# Patient Record
(demographics unavailable — no encounter records)

---

## 2025-07-22 NOTE — OB HISTORY
[Total Preg ___] : : [unfilled] [Living ___] : [unfilled] (living) [Menopause  Age ____] : menopause occurred at age [unfilled]

## 2025-07-23 NOTE — PAST MEDICAL HISTORY
[Total Preg ___] : G[unfilled] [Living ___] : Living: [unfilled] [Menarche Age ____] : age at menarche was [unfilled] [Menopause Age____] : age at menopause was [unfilled]

## 2025-07-23 NOTE — PLAN
[TextEntry] : EUA TLH/BSO Omentectomy possible laparotomy tumor debulking and all indicated procedures. Options for surgical management discussed. Procedures offered patient included laparoscopic hysterectomy with bilateral salpingo-oophorectomy, along with possible staging.   The risk benefits and alternative to the recommended treatments were discussed. She was informed about potential complications of surgery including but not limited to bowel, bladder, and ureteral injuries. Infectious morbidity, along with bleeding and thromboembolic events were also discussed.   She showed clear understanding, was given the opportunity to ask questions and is amenable to the above surgical treatment. The patient will be setup for the above procedure in the near future.

## 2025-07-23 NOTE — HISTORY OF PRESENT ILLNESS
[FreeTextEntry1] : 65 year old para 0-0-0-0, LMP at age 50, patient of [facility] in [facility]. She was recently evaluated in ER (March) in [place]  with c/o worsening abdominal pain, abdominal distention and exertional dyspnea.  CT chest abdomen and pelvis noted for large right pleural effusion with partial compressive atelectasis of the right lower lobe without any evidence of pulmonary pleural nodules or mediastinal lymphadenopathy, also noted for heterogeneous mass in the bilateral adnexal regions highly suspicious for ovarian neoplasm. large volume ascites and omental nodularity and cake formation suggestive of peritoneal metastasis. She is s/p neoadjuvant chemotherapy x 6 cycles.  Tumor Markers:  CEA = 2.2 (05/27/25) CA 19-9 = 62 (05/27/25)  = 441 (03/10/25)  = 17.9 (06/17/25)  Most recent Pet (03/18/25) FINDINGS: Neck: there is no adenopathy No abnormal FDG avidity is present in the neck Thorax: Lungs: bibasilar atelectasis Mediastinum: Anterior superior mediastinal subcentimeter lymph node SUV max 2.0 image 67, precarinal subcentimeter lymph nodes image 73 SUV max 2.4. Left hilar lymph node SUV max 3.1 image 75 measuring 0.7 cm. Subcarinal lymph node SUV max 3.1 short axis 0.6 cm. Right hilar hypermetabolic activity SUV max 2.4. Right axillary lymph node image 71 short axis 0.7 cm SUV max 2.2. Hiatal hernia with hypermetabolic activity  SUV max 3.3 image 105. There is no hilar adenopathy. Chest wall: Intact There is moderate size right pleural effusion with low level hypermetabolic activity. There is no pericardial effusion. Abdomen: Evaluation of the solid intra-abdominal organs is limited secondary to the lack of intravenous contrast. The spleen, and kidneys are unremarkable. There is hypermetabolic focus medial segment left hepatic lobe SUV Max 4.6 which is indeterminant. Hypermetabolic activity contiguous with ventral body of the pancreas SUV max 9.3 image 127. Nodularity of the mesentery left upper quadrant SUV max 6.1. Abdominal ascites with hypermetabolic peritoneal activity upper abdomen SUV Max 3.2. Left mid abdominal mesenteric nodularity SUV Max 8.0 Right lower quadrant mesenteric nodularity with SUV max 4.8 image 162. There are hypermetabolic subcentimeter peripancreatic lymph nodes and periaortic lymph nodes.  There is left adrenal gland hypermetabolic focus image 121 SUV max 4.1. Soft tissue densities within the posterior mid pelvis/adnexa with SUV max 8.8. there is tracer uptake in the posterior inferior mid pelvis suspected region of the uterus SUV Max 11.3. Hypermetabolic activity within the pelvic peritoneum peripherally SUV max 2.0. No bowel obstruction is seen. Skeletal: There is no destructive osseous lesion. Physiologic carica, gastrointestinal and genitourinary FDG uptake is noted. IMPRESSION: Hypermetabolic metastatic disease involving thorax, abdomen and pelvis as above.  Repeat Pet CT (06/28/25) Chest: Interval resolution of bilateral pleural effusions. Non-FDG avid 2mm right upper lobe subpleural nodule (3/55), unchanged. Non-FDG avid 3mm left upper lobe pulmonary nodule (3/76), unchanged. Non-FDG avid 2mm left lower lobe pulmonary nodule (3/85), unchanged. Non-FDG avid 2mm left lower lobe subpleural nodule (3/85), unchanged. Right chest wall infuse a port with distal tip terminating in the right atrium. Physiologic FDG avidity is seen in the mediastinal blood pool and myocardium. Thoracic nodes: Overall interval improvement in FDG avid thoracic lymphadenopathy. 0.9 cm precarinal node (3/71), previously 1.1 cm, SUV max 2.5, previously 2.7. Decreased left hilar uptake (image 76), SUV max 2.7, previously 3.1. Decreased 0.5 cm subcarinal node (3/76), SUV max 2.5, previously 3.3 Hepatobiliary: Previously described hypermetabolic focus in the left lobe of the liver is not visualized on this study. Increased non-FDG avid 1.0 cm indeterminate right lobe liver hypodensity (2/112), previously 0.7 cm. Increased non FDG avid 0.9 cm left lobe liver hypodensity (3/125), too small to characterize, previously 0.5 cm. Spleen: No abnormal uptake. Pancreas: No abnormal uptake Adrenal glands: No abnormal uptake. Kitneys/utereters/bladder: Physiologic activity is seen. Abdominopelvic nodes: No abdnormal uptake. GI tract: Physiologic activity is seen. No abnormal uptake Peritoneium: Significant internavl improvement of previously described FDG avid peritoneal/mesenteric nodularity/uptake. Question of mild posterior left pelvic peritoneal uptake versus misregistration from adjacent loop of bowel (image 179), SUB max 2.0 Resolution of previous ascites. Reproductive organs: No abnormal uptake Bones/soft tissues: No abnormal uptake Additional Findings: None Impression:  1. Significant interval improvement of previously described hypermetabolic metastatic disease throughout the chest, abdomen, and pelvis, as above. 2. Interval resolution of bilateral pleural effusions and ascites. 3. No interval change in size of non-FDG avid bilateral pulmonary nodules measuring up to 3mm.  Presents today for initial office evaluation.  She is requesting definitive surgery.

## 2025-07-23 NOTE — ASSESSMENT
[FreeTextEntry1] : 65 year old para 0-0-0-0, LMP at age 50, With stage IV Ovarian Ca s/p neoadjuvant (NAC) x 6 cycles. With a complete CR. She presents today for definitive surgery.  = 441 (03/10/25) Down to 17.9 (06/17/25). I informed the patient that a laparotomy may be required to complete the above procedure.

## 2025-07-23 NOTE — PHYSICAL EXAM
[Chaperoned Physical Exam] : A chaperone was present in the examining room during all aspects of the physical examination. [MA] : MA [FreeTextEntry2] : Fabienne Tamayo  [TextEntry] : Well - developed, well-nourished female in no acute distress HEENT - wnl Breasts - symmetrical w/o masses or nipple discharge Abdomen - soft, non-tender, +BS Back - no CVA tenderness or spinal tenderness Extremities - w/o clubbing, cyanosis, no lesions noted Neuro - AAOx3  Pelvic Exam External Genitalia - with out lesions Vagina - mucosa atrophic, no lesions noted Cervix - no lesions Uterus -  6 wk size, nontender, mobile Adnexa - no palpable masses or tenderness b/l Rectovaginal - confirmatory

## 2025-07-23 NOTE — HISTORY OF PRESENT ILLNESS
[FreeTextEntry1] : 65 year old para 0-0-0-0, LMP at age 50, patient of [facility] in [facility]. She was recently evaluated in ER (March) in [place]  with c/o worsening abdominal pain, abdominal distention and exertional dyspnea.  CT chest abdomen and pelvis noted for large right pleural effusion with partial compressive atelectasis of the right lower lobe without any evidence of pulmonary pleural nodules or mediastinal lymphadenopathy, also noted for heterogeneous mass in the bilateral adnexal regions highly suspicious for ovarian neoplasm. large volume ascites and omental nodularity and cake formation suggestive of peritoneal metastasis. She is s/p neoadjuvant chemotherapy x 6 cycles.  Tumor Markers:  CEA = 2.2 (05/27/25) CA 19-9 = 62 (05/27/25)  = 441 (03/10/25)  = 17.9 (06/17/25)  Most recent Pet (03/18/25) FINDINGS: Neck: there is no adenopathy No abnormal FDG avidity is present in the neck Thorax: Lungs: bibasilar atelectasis Mediastinum: Anterior superior mediastinal subcentimeter lymph node SUV max 2.0 image 67, precarinal subcentimeter lymph nodes image 73 SUV max 2.4. Left hilar lymph node SUV max 3.1 image 75 measuring 0.7 cm. Subcarinal lymph node SUV max 3.1 short axis 0.6 cm. Right hilar hypermetabolic activity SUV max 2.4. Right axillary lymph node image 71 short axis 0.7 cm SUV max 2.2. Hiatal hernia with hypermetabolic activity  SUV max 3.3 image 105. There is no hilar adenopathy. Chest wall: Intact There is moderate size right pleural effusion with low level hypermetabolic activity. There is no pericardial effusion. Abdomen: Evaluation of the solid intra-abdominal organs is limited secondary to the lack of intravenous contrast. The spleen, and kidneys are unremarkable. There is hypermetabolic focus medial segment left hepatic lobe SUV Max 4.6 which is indeterminant. Hypermetabolic activity contiguous with ventral body of the pancreas SUV max 9.3 image 127. Nodularity of the mesentery left upper quadrant SUV max 6.1. Abdominal ascites with hypermetabolic peritoneal activity upper abdomen SUV Max 3.2. Left mid abdominal mesenteric nodularity SUV Max 8.0 Right lower quadrant mesenteric nodularity with SUV max 4.8 image 162. There are hypermetabolic subcentimeter peripancreatic lymph nodes and periaortic lymph nodes.  There is left adrenal gland hypermetabolic focus image 121 SUV max 4.1. Soft tissue densities within the posterior mid pelvis/adnexa with SUV max 8.8. there is tracer uptake in the posterior inferior mid pelvis suspected region of the uterus SUV Max 11.3. Hypermetabolic activity within the pelvic peritoneum peripherally SUV max 2.0. No bowel obstruction is seen. Skeletal: There is no destructive osseous lesion. Physiologic carica, gastrointestinal and genitourinary FDG uptake is noted. IMPRESSION: Hypermetabolic metastatic disease involving thorax, abdomen and pelvis as above.  Repeat Pet CT (06/28/25) Chest: Interval resolution of bilateral pleural effusions. Non-FDG avid 2mm right upper lobe subpleural nodule (3/55), unchanged. Non-FDG avid 3mm left upper lobe pulmonary nodule (3/76), unchanged. Non-FDG avid 2mm left lower lobe pulmonary nodule (3/85), unchanged. Non-FDG avid 2mm left lower lobe subpleural nodule (3/85), unchanged. Right chest wall infuse a port with distal tip terminating in the right atrium. Physiologic FDG avidity is seen in the mediastinal blood pool and myocardium. Thoracic nodes: Overall interval improvement in FDG avid thoracic lymphadenopathy. 0.9 cm precarinal node (3/71), previously 1.1 cm, SUV max 2.5, previously 2.7. Decreased left hilar uptake (image 76), SUV max 2.7, previously 3.1. Decreased 0.5 cm subcarinal node (3/76), SUV max 2.5, previously 3.3 Hepatobiliary: Previously described hypermetabolic focus in the left lobe of the liver is not visualized on this study. Increased non-FDG avid 1.0 cm indeterminate right lobe liver hypodensity (2/112), previously 0.7 cm. Increased non FDG avid 0.9 cm left lobe liver hypodensity (3/125), too small to characterize, previously 0.5 cm. Spleen: No abnormal uptake. Pancreas: No abnormal uptake Adrenal glands: No abnormal uptake. Kitneys/utereters/bladder: Physiologic activity is seen. Abdominopelvic nodes: No abdnormal uptake. GI tract: Physiologic activity is seen. No abnormal uptake Peritoneium: Significant internavl improvement of previously described FDG avid peritoneal/mesenteric nodularity/uptake. Question of mild posterior left pelvic peritoneal uptake versus misregistration from adjacent loop of bowel (image 179), SUB max 2.0 Resolution of previous ascites. Reproductive organs: No abnormal uptake Bones/soft tissues: No abnormal uptake Additional Findings: None Impression:  1. Significant interval improvement of previously described hypermetabolic metastatic disease throughout the chest, abdomen, and pelvis, as above. 2. Interval resolution of bilateral pleural effusions and ascites. 3. No interval change in size of non-FDG avid bilateral pulmonary nodules measuring up to 3mm.  Presents today for initial office evaluation.  She is requesting definitive surgery.

## 2025-07-23 NOTE — HISTORY OF PRESENT ILLNESS
[FreeTextEntry1] : 65 year old para 0-0-0-0, LMP at age 50, patient of [facility] in [facility]. She was recently evaluated in ER (March) in [place]  with c/o worsening abdominal pain, abdominal distention and exertional dyspnea.  CT chest abdomen and pelvis noted for large right pleural effusion with partial compressive atelectasis of the right lower lobe without any evidence of pulmonary pleural nodules or mediastinal lymphadenopathy, also noted for heterogeneous mass in the bilateral adnexal regions highly suspicious for ovarian neoplasm. large volume ascites and omental nodularity and cake formation suggestive of peritoneal metastasis. She is s/p neoadjuvant chemotherapy x 6 cycles.  Tumor Markers:  CEA = 2.2 (05/27/25) CA 19-9 = 62 (05/27/25)  = 441 (03/10/25)  = 17.9 (06/17/25)  Most recent Pet (03/18/25) FINDINGS: Neck: there is no adenopathy No abnormal FDG avidity is present in the neck Thorax: Lungs: bibasilar atelectasis Mediastinum: Anterior superior mediastinal subcentimeter lymph node SUV max 2.0 image 67, precarinal subcentimeter lymph nodes image 73 SUV max 2.4. Left hilar lymph node SUV max 3.1 image 75 measuring 0.7 cm. Subcarinal lymph node SUV max 3.1 short axis 0.6 cm. Right hilar hypermetabolic activity SUV max 2.4. Right axillary lymph node image 71 short axis 0.7 cm SUV max 2.2. Hiatal hernia with hypermetabolic activity  SUV max 3.3 image 105. There is no hilar adenopathy. Chest wall: Intact There is moderate size right pleural effusion with low level hypermetabolic activity. There is no pericardial effusion. Abdomen: Evaluation of the solid intra-abdominal organs is limited secondary to the lack of intravenous contrast. The spleen, and kidneys are unremarkable. There is hypermetabolic focus medial segment left hepatic lobe SUV Max 4.6 which is indeterminant. Hypermetabolic activity contiguous with ventral body of the pancreas SUV max 9.3 image 127. Nodularity of the mesentery left upper quadrant SUV max 6.1. Abdominal ascites with hypermetabolic peritoneal activity upper abdomen SUV Max 3.2. Left mid abdominal mesenteric nodularity SUV Max 8.0 Right lower quadrant mesenteric nodularity with SUV max 4.8 image 162. There are hypermetabolic subcentimeter peripancreatic lymph nodes and periaortic lymph nodes.  There is left adrenal gland hypermetabolic focus image 121 SUV max 4.1. Soft tissue densities within the posterior mid pelvis/adnexa with SUV max 8.8. there is tracer uptake in the posterior inferior mid pelvis suspected region of the uterus SUV Max 11.3. Hypermetabolic activity within the pelvic peritoneum peripherally SUV max 2.0. No bowel obstruction is seen. Skeletal: There is no destructive osseous lesion. Physiologic carica, gastrointestinal and genitourinary FDG uptake is noted. IMPRESSION: Hypermetabolic metastatic disease involving thorax, abdomen and pelvis as above.  Repeat Pet CT (06/28/25) Chest: Interval resolution of bilateral pleural effusions. Non-FDG avid 2mm right upper lobe subpleural nodule (3/55), unchanged. Non-FDG avid 3mm left upper lobe pulmonary nodule (3/76), unchanged. Non-FDG avid 2mm left lower lobe pulmonary nodule (3/85), unchanged. Non-FDG avid 2mm left lower lobe subpleural nodule (3/85), unchanged. Right chest wall infuse a port with distal tip terminating in the right atrium. Physiologic FDG avidity is seen in the mediastinal blood pool and myocardium. Thoracic nodes: Overall interval improvement in FDG avid thoracic lymphadenopathy. 0.9 cm precarinal node (3/71), previously 1.1 cm, SUV max 2.5, previously 2.7. Decreased left hilar uptake (image 76), SUV max 2.7, previously 3.1. Decreased 0.5 cm subcarinal node (3/76), SUV max 2.5, previously 3.3 Hepatobiliary: Previously described hypermetabolic focus in the left lobe of the liver is not visualized on this study. Increased non-FDG avid 1.0 cm indeterminate right lobe liver hypodensity (2/112), previously 0.7 cm. Increased non FDG avid 0.9 cm left lobe liver hypodensity (3/125), too small to characterize, previously 0.5 cm. Spleen: No abnormal uptake. Pancreas: No abnormal uptake Adrenal glands: No abnormal uptake. Kitneys/utereters/bladder: Physiologic activity is seen. Abdominopelvic nodes: No abdnormal uptake. GI tract: Physiologic activity is seen. No abnormal uptake Peritoneium: Significant internavl improvement of previously described FDG avid peritoneal/mesenteric nodularity/uptake. Question of mild posterior left pelvic peritoneal uptake versus misregistration from adjacent loop of bowel (image 179), SUB max 2.0 Resolution of previous ascites. Reproductive organs: No abnormal uptake Bones/soft tissues: No abnormal uptake Additional Findings: None Impression:  1. Significant interval improvement of previously described hypermetabolic metastatic disease throughout the chest, abdomen, and pelvis, as above. 2. Interval resolution of bilateral pleural effusions and ascites. 3. No interval change in size of non-FDG avid bilateral pulmonary nodules measuring up to 3mm.  Presents today for initial office evaluation.  She is requesting definitive surgery.